# Patient Record
Sex: FEMALE | Race: WHITE | NOT HISPANIC OR LATINO | Employment: OTHER
[De-identification: names, ages, dates, MRNs, and addresses within clinical notes are randomized per-mention and may not be internally consistent; named-entity substitution may affect disease eponyms.]

---

## 2018-09-11 ENCOUNTER — DIAGNOSTIC TRANS (OUTPATIENT)
Dept: OTHER | Age: 68
End: 2018-09-11

## 2018-09-11 ENCOUNTER — HOSPITAL (OUTPATIENT)
Dept: OTHER | Age: 68
End: 2018-09-11
Attending: INTERNAL MEDICINE

## 2018-09-11 LAB
AMORPH SED URNS QL MICRO: ABNORMAL
ANALYZER ANC (IANC): ABNORMAL
ANION GAP SERPL CALC-SCNC: 9 MMOL/L (ref 10–20)
APPEARANCE UR: CLEAR
BACTERIA #/AREA URNS HPF: ABNORMAL /HPF
BASOPHILS # BLD: 0.1 THOUSAND/MCL (ref 0–0.3)
BASOPHILS NFR BLD: 1 %
BILIRUB UR QL: NEGATIVE
BUN SERPL-MCNC: 13 MG/DL (ref 6–20)
BUN/CREAT SERPL: 13 (ref 7–25)
CALCIUM SERPL-MCNC: 9.3 MG/DL (ref 8.4–10.2)
CAOX CRY URNS QL MICRO: ABNORMAL
CHLORIDE: 103 MMOL/L (ref 98–107)
CO2 SERPL-SCNC: 29 MMOL/L (ref 21–32)
COLOR UR: ABNORMAL
CREAT SERPL-MCNC: 1 MG/DL (ref 0.51–0.95)
DIFFERENTIAL METHOD BLD: ABNORMAL
EOSINOPHIL # BLD: 0.2 THOUSAND/MCL (ref 0.1–0.5)
EOSINOPHIL NFR BLD: 4 %
EPITH CASTS #/AREA URNS LPF: ABNORMAL /[LPF]
ERYTHROCYTE [DISTWIDTH] IN BLOOD: 13.2 % (ref 11–15)
FATTY CASTS #/AREA URNS LPF: ABNORMAL /[LPF]
GLUCOSE SERPL-MCNC: 64 MG/DL (ref 65–99)
GLUCOSE UR-MCNC: NEGATIVE MG/DL
GRAN CASTS #/AREA URNS LPF: ABNORMAL /[LPF]
HEMATOCRIT: 40.9 % (ref 36–46.5)
HGB BLD-MCNC: 13.7 GM/DL (ref 12–15.5)
HGB UR QL: ABNORMAL
HYALINE CASTS #/AREA URNS LPF: ABNORMAL /LPF (ref 0–5)
IMM GRANULOCYTES # BLD AUTO: 0 THOUSAND/MCL (ref 0–0.2)
IMM GRANULOCYTES NFR BLD: 0 %
INR PPP: 1
KETONES UR-MCNC: NEGATIVE MG/DL
LEUKOCYTE ESTERASE UR QL STRIP: NEGATIVE
LYMPHOCYTES # BLD: 0.8 THOUSAND/MCL (ref 1–4)
LYMPHOCYTES NFR BLD: 15 %
MCH RBC QN AUTO: 27.7 PG (ref 26–34)
MCHC RBC AUTO-ENTMCNC: 33.5 GM/DL (ref 32–36.5)
MCV RBC AUTO: 82.8 FL (ref 78–100)
MIXED CELL CASTS #/AREA URNS LPF: ABNORMAL /[LPF]
MONOCYTES # BLD: 0.5 THOUSAND/MCL (ref 0.3–0.9)
MONOCYTES NFR BLD: 8 %
MUCOUS THREADS URNS QL MICRO: ABNORMAL
NEUTROPHILS # BLD: 4 THOUSAND/MCL (ref 1.8–7.7)
NEUTROPHILS NFR BLD: 72 %
NEUTS SEG NFR BLD: ABNORMAL %
NITRITE UR QL: NEGATIVE
NRBC (NRBCRE): 0 /100 WBC
PH UR: 7 UNIT (ref 5–7)
PLATELET # BLD: 182 THOUSAND/MCL (ref 140–450)
POTASSIUM SERPL-SCNC: 4.1 MMOL/L (ref 3.4–5.1)
PROT UR QL: NEGATIVE MG/DL
PROTHROMBIN TIME: 10.7 SECONDS (ref 9.7–11.8)
PROTHROMBIN TIME: NORMAL
RBC # BLD: 4.94 MILLION/MCL (ref 4–5.2)
RBC #/AREA URNS HPF: ABNORMAL /HPF (ref 0–3)
RBC CASTS #/AREA URNS LPF: ABNORMAL /[LPF]
RENAL EPI CELLS #/AREA URNS HPF: ABNORMAL /[HPF]
SODIUM SERPL-SCNC: 137 MMOL/L (ref 135–145)
SP GR UR: 1.01 (ref 1–1.03)
SPECIMEN SOURCE: ABNORMAL
SPERM URNS QL MICRO: ABNORMAL
SQUAMOUS #/AREA URNS HPF: ABNORMAL /HPF (ref 0–5)
T VAGINALIS URNS QL MICRO: ABNORMAL
TRI-PHOS CRY URNS QL MICRO: ABNORMAL
TSH SERPL-ACNC: 0.25 MCUNIT/ML (ref 0.35–5)
URATE CRY URNS QL MICRO: ABNORMAL
UROBILINOGEN UR QL: 0.2 MG/DL (ref 0–1)
WAXY CASTS #/AREA URNS LPF: ABNORMAL /[LPF]
WBC # BLD: 5.6 THOUSAND/MCL (ref 4.2–11)
WBC #/AREA URNS HPF: ABNORMAL /HPF (ref 0–5)
WBC CASTS #/AREA URNS LPF: ABNORMAL /[LPF]
YEAST HYPHAE URNS QL MICRO: ABNORMAL
YEAST URNS QL MICRO: ABNORMAL

## 2018-09-20 ENCOUNTER — CHARTING TRANS (OUTPATIENT)
Dept: OTHER | Age: 68
End: 2018-09-20

## 2018-09-20 ENCOUNTER — HOSPITAL (OUTPATIENT)
Dept: OTHER | Age: 68
End: 2018-09-20
Attending: INTERNAL MEDICINE

## 2018-09-21 LAB
ANALYZER ANC (IANC): ABNORMAL
ANION GAP SERPL CALC-SCNC: 13 MMOL/L (ref 10–20)
BASOPHILS # BLD: 0 THOUSAND/MCL (ref 0–0.3)
BASOPHILS NFR BLD: 0 %
BUN SERPL-MCNC: 14 MG/DL (ref 6–20)
BUN/CREAT SERPL: 16 (ref 7–25)
CALCIUM SERPL-MCNC: 8.2 MG/DL (ref 8.4–10.2)
CHLORIDE: 103 MMOL/L (ref 98–107)
CO2 SERPL-SCNC: 26 MMOL/L (ref 21–32)
CREAT SERPL-MCNC: 0.87 MG/DL (ref 0.51–0.95)
DIFFERENTIAL METHOD BLD: ABNORMAL
EOSINOPHIL # BLD: 0 THOUSAND/MCL (ref 0.1–0.5)
EOSINOPHIL NFR BLD: 0 %
ERYTHROCYTE [DISTWIDTH] IN BLOOD: 13.1 % (ref 11–15)
GLUCOSE SERPL-MCNC: 160 MG/DL (ref 65–99)
HEMATOCRIT: 33.7 % (ref 36–46.5)
HGB BLD-MCNC: 11.4 GM/DL (ref 12–15.5)
IMM GRANULOCYTES # BLD AUTO: 0 THOUSAND/MCL (ref 0–0.2)
IMM GRANULOCYTES NFR BLD: 0 %
LYMPHOCYTES # BLD: 0.7 THOUSAND/MCL (ref 1–4)
LYMPHOCYTES NFR BLD: 6 %
MCH RBC QN AUTO: 27.7 PG (ref 26–34)
MCHC RBC AUTO-ENTMCNC: 33.8 GM/DL (ref 32–36.5)
MCV RBC AUTO: 82 FL (ref 78–100)
MONOCYTES # BLD: 0.5 THOUSAND/MCL (ref 0.3–0.9)
MONOCYTES NFR BLD: 4 %
NEUTROPHILS # BLD: 10.6 THOUSAND/MCL (ref 1.8–7.7)
NEUTROPHILS NFR BLD: 90 %
NEUTS SEG NFR BLD: ABNORMAL %
NRBC (NRBCRE): 0 /100 WBC
PLATELET # BLD: 157 THOUSAND/MCL (ref 140–450)
POTASSIUM SERPL-SCNC: 3.9 MMOL/L (ref 3.4–5.1)
RBC # BLD: 4.11 MILLION/MCL (ref 4–5.2)
SODIUM SERPL-SCNC: 138 MMOL/L (ref 135–145)
WBC # BLD: 11.8 THOUSAND/MCL (ref 4.2–11)

## 2021-06-17 ENCOUNTER — IMAGING SERVICES (OUTPATIENT)
Dept: MRI IMAGING | Age: 71
End: 2021-06-17
Attending: PODIATRIST

## 2021-06-17 DIAGNOSIS — M19.079 ARTHRITIS, MIDFOOT: ICD-10-CM

## 2021-06-17 PROCEDURE — 73718 MRI LOWER EXTREMITY W/O DYE: CPT | Performed by: RADIOLOGY

## 2023-01-31 ENCOUNTER — APPOINTMENT (OUTPATIENT)
Dept: URBAN - METROPOLITAN AREA CLINIC 245 | Age: 73
Setting detail: DERMATOLOGY
End: 2023-02-01

## 2023-01-31 DIAGNOSIS — B35.1 TINEA UNGUIUM: ICD-10-CM

## 2023-01-31 PROCEDURE — 99213 OFFICE O/P EST LOW 20 MIN: CPT

## 2023-01-31 PROCEDURE — OTHER COUNSELING: OTHER

## 2023-01-31 PROCEDURE — OTHER PRESCRIPTION: OTHER

## 2023-01-31 PROCEDURE — OTHER MIPS QUALITY: OTHER

## 2023-01-31 RX ORDER — GENTAMICIN SULFATE 1 MG/G
OINTMENT TOPICAL
Qty: 15 | Refills: 5 | Status: ACTIVE

## 2023-01-31 NOTE — PROCEDURE: MIPS QUALITY
2019 [facility] Clinical Data Registry (for Quality Improvement)     Postoperative nausea/vomiting risk protocol (Adult = 18 yrs and Pediatric 3-17 yrs)- (430 and 463)  General inhalation anesthetic (NOT TIVA) with PONV risk factors: Yes  Provision of anti-emetic therapy with at least 2 different classes of agents: Yes   Patient DID NOT receive anti-emetic therapy and reason is documented in Medical Record:  N/A    Multimodal Pain Management- (AQI59)  Patient undergoing Elective Surgery (i.e. Outpatient, or ASC, or Prescheduled Surgery prior to Hospital Admission): Yes  Use of Multimodal Pain Management, two or more drugs and/or interventions, NOT including systemic opioids: Yes   Exception: Documented allergy to multiple classes of analgesics:  N/A    PACU assessment of acute postoperative pain prior to Anesthesia Care End- Applies to Patients Age = 18- (ABG7)  Initial PACU pain score is which of the following: < 7/10  Patient unable to report pain score: N/A    Post-anesthetic transfer of care checklist/protocol to PACU/ICU- (426 and 427)  Upon conclusion of case, patient transferred to which of the following locations: PACU/Non-ICU  Use of transfer checklist/protocol: Yes  Exclusion: Service Performed in Patient Hospital Room (and thus did not require transfer): N/A    PACU Reintubation- (AQI31)  General anesthesia requiring endotracheal intubation (ETT) along with subsequent extubation in OR or PACU: Yes  Required reintubation in the PACU: No   Extubation was a planned trial documented in the medical record prior to removal of the original airway device:  N/A    Unplanned admission to ICU related to anesthesia service up through end of PACU care- (MD51)  Unplanned admission to ICU (not initially anticipated at anesthesia start time): No         
Quality 110: Preventive Care And Screening: Influenza Immunization: Influenza Immunization previously received during influenza season
Detail Level: Detailed
Quality 226: Preventive Care And Screening: Tobacco Use: Screening And Cessation Intervention: Patient screened for tobacco use and is an ex/non-smoker

## 2023-06-19 ENCOUNTER — APPOINTMENT (OUTPATIENT)
Dept: URBAN - METROPOLITAN AREA CLINIC 245 | Age: 73
Setting detail: DERMATOLOGY
End: 2023-06-19

## 2023-06-19 DIAGNOSIS — Z85.828 PERSONAL HISTORY OF OTHER MALIGNANT NEOPLASM OF SKIN: ICD-10-CM

## 2023-06-19 DIAGNOSIS — L81.4 OTHER MELANIN HYPERPIGMENTATION: ICD-10-CM

## 2023-06-19 DIAGNOSIS — L57.0 ACTINIC KERATOSIS: ICD-10-CM

## 2023-06-19 DIAGNOSIS — L82.1 OTHER SEBORRHEIC KERATOSIS: ICD-10-CM

## 2023-06-19 DIAGNOSIS — D22 MELANOCYTIC NEVI: ICD-10-CM

## 2023-06-19 DIAGNOSIS — L82.0 INFLAMED SEBORRHEIC KERATOSIS: ICD-10-CM

## 2023-06-19 DIAGNOSIS — D18.0 HEMANGIOMA: ICD-10-CM

## 2023-06-19 PROBLEM — D18.01 HEMANGIOMA OF SKIN AND SUBCUTANEOUS TISSUE: Status: ACTIVE | Noted: 2023-06-19

## 2023-06-19 PROBLEM — D22.5 MELANOCYTIC NEVI OF TRUNK: Status: ACTIVE | Noted: 2023-06-19

## 2023-06-19 PROCEDURE — OTHER LIQUID NITROGEN: OTHER

## 2023-06-19 PROCEDURE — 99213 OFFICE O/P EST LOW 20 MIN: CPT | Mod: 25

## 2023-06-19 PROCEDURE — OTHER MIPS QUALITY: OTHER

## 2023-06-19 PROCEDURE — OTHER COUNSELING: OTHER

## 2023-06-19 PROCEDURE — 17110 DESTRUCT B9 LESION 1-14: CPT

## 2023-06-19 PROCEDURE — 17000 DESTRUCT PREMALG LESION: CPT | Mod: 59

## 2023-06-19 ASSESSMENT — LOCATION DETAILED DESCRIPTION DERM
LOCATION DETAILED: MIDDLE STERNUM
LOCATION DETAILED: LEFT RIB CAGE
LOCATION DETAILED: LEFT INFRAMAMMARY CREASE (INNER QUADRANT)
LOCATION DETAILED: LEFT ANTERIOR PROXIMAL THIGH
LOCATION DETAILED: SUBXIPHOID
LOCATION DETAILED: LEFT SUPERIOR UPPER BACK
LOCATION DETAILED: RIGHT RIB CAGE
LOCATION DETAILED: EPIGASTRIC SKIN
LOCATION DETAILED: RIGHT MID-UPPER BACK
LOCATION DETAILED: RIGHT ANTERIOR SHOULDER

## 2023-06-19 ASSESSMENT — LOCATION SIMPLE DESCRIPTION DERM
LOCATION SIMPLE: RIGHT UPPER BACK
LOCATION SIMPLE: ABDOMEN
LOCATION SIMPLE: LEFT THIGH
LOCATION SIMPLE: CHEST
LOCATION SIMPLE: LEFT BREAST
LOCATION SIMPLE: RIGHT SHOULDER
LOCATION SIMPLE: LEFT UPPER BACK

## 2023-06-19 ASSESSMENT — LOCATION ZONE DERM
LOCATION ZONE: LEG
LOCATION ZONE: TRUNK
LOCATION ZONE: ARM

## 2023-06-19 NOTE — PROCEDURE: LIQUID NITROGEN
Show Spray Paint Technique Variable?: Yes
Include Z78.9 (Other Specified Conditions Influencing Health Status) As An Associated Diagnosis?: No
Number Of Freeze-Thaw Cycles: 1 freeze-thaw cycle
Post-Care Instructions: Patient is to avoid UV exposure, wear sun protection and avoid picking at any of the treated lesions. Pt may apply Vaseline to crusted or scabbing areas.
Detail Level: Detailed
Medical Necessity Information: It is in your best interest to select a reason for this procedure from the list below. All of these items fulfill various CMS LCD requirements except the new and changing color options.
Application Tool (Optional): Liquid Nitrogen Sprayer
Spray Paint Text: The liquid nitrogen was applied to the skin utilizing a spray paint frosting technique.
Post-Care Instructions: Patient is to wear sun protection and avoid picking at any of the treated lesions. Pt may apply Vaseline to crusted or scabbing areas.
Consent was obtained to proceed with treatment.
Medical Necessity Clause: This procedure was medically necessary because the lesions that were treated were:
Consent was obtained prior to treatment.
Duration Of Freeze Thaw-Cycle (Seconds): 5
Duration Of Freeze Thaw-Cycle (Seconds): 2
Number Of Freeze-Thaw Cycles: 2 freeze-thaw cycles

## 2023-10-25 DIAGNOSIS — M48.02 CERVICAL SPINAL STENOSIS: Primary | ICD-10-CM

## 2023-11-01 ENCOUNTER — IMAGING SERVICES (OUTPATIENT)
Dept: MRI IMAGING | Age: 73
End: 2023-11-01
Attending: PSYCHIATRY & NEUROLOGY

## 2023-11-01 DIAGNOSIS — M48.02 CERVICAL SPINAL STENOSIS: ICD-10-CM

## 2023-11-01 PROCEDURE — 72141 MRI NECK SPINE W/O DYE: CPT | Performed by: RADIOLOGY

## 2024-03-21 ENCOUNTER — APPOINTMENT (RX ONLY)
Dept: URBAN - METROPOLITAN AREA CLINIC 84 | Facility: CLINIC | Age: 74
Setting detail: DERMATOLOGY
End: 2024-03-21

## 2024-03-21 DIAGNOSIS — L30.4 ERYTHEMA INTERTRIGO: ICD-10-CM

## 2024-03-21 DIAGNOSIS — L20.89 OTHER ATOPIC DERMATITIS: ICD-10-CM

## 2024-03-21 PROCEDURE — ? PRESCRIPTION MEDICATION MANAGEMENT

## 2024-03-21 PROCEDURE — ? PRESCRIPTION

## 2024-03-21 PROCEDURE — 99203 OFFICE O/P NEW LOW 30 MIN: CPT

## 2024-03-21 PROCEDURE — ? COUNSELING

## 2024-03-21 RX ORDER — NYSTATIN 100000 [USP'U]/G
POWDER TOPICAL
Qty: 60 | Refills: 4 | Status: ERX | COMMUNITY
Start: 2024-03-21

## 2024-03-21 RX ORDER — KETOCONAZOLE 20 MG/G
CREAM TOPICAL TWICE DAILY
Qty: 60 | Refills: 2 | Status: ERX | COMMUNITY
Start: 2024-03-21

## 2024-03-21 RX ORDER — TRIAMCINOLONE ACETONIDE 1 MG/G
CREAM TOPICAL
Qty: 80 | Refills: 6 | Status: ERX | COMMUNITY
Start: 2024-03-21

## 2024-03-21 RX ADMIN — NYSTATIN: 100000 POWDER TOPICAL at 00:00

## 2024-03-21 RX ADMIN — KETOCONAZOLE: 20 CREAM TOPICAL at 00:00

## 2024-03-21 RX ADMIN — TRIAMCINOLONE ACETONIDE: 1 CREAM TOPICAL at 00:00

## 2024-03-21 ASSESSMENT — LOCATION SIMPLE DESCRIPTION DERM
LOCATION SIMPLE: LEFT THIGH
LOCATION SIMPLE: GROIN

## 2024-03-21 ASSESSMENT — LOCATION DETAILED DESCRIPTION DERM
LOCATION DETAILED: LEFT ANTERIOR PROXIMAL THIGH
LOCATION DETAILED: RIGHT SUPRAPUBIC SKIN

## 2024-03-21 ASSESSMENT — LOCATION ZONE DERM
LOCATION ZONE: LEG
LOCATION ZONE: TRUNK

## 2024-03-21 NOTE — PROCEDURE: PRESCRIPTION MEDICATION MANAGEMENT
Plan: Cleanse daily Pat dry or use blow dryer
Render In Strict Bullet Format?: No
Initiate Treatment: nystatin 100,000 unit/gram topical powder \\nQuantity: 60.0 g  Days Supply: 30\\nSig: Apply once a day for maintenance to AA\\n\\nketoconazole 2 % topical cream Twice daily\\nQuantity: 60.0 g  Days Supply: 30\\nSig: Mix 1:1 with Triamcinolone cream to affected area in groin 2x daily until better\\n\\ntriamcinolone acetonide 0.1 % topical cream \\nQuantity: 80.0 g  Days Supply: 30\\nSig: Mix with ketoconazole cream Apply to affected area 2x daily then as needed for active flares
Detail Level: Zone
Initiate Treatment: triamcinolone acetonide 0.1 % topical cream
Discontinue Regimen: Clobetasol from prior provider. Yuko reviewed risks of atrophy and potency of the medication

## 2024-06-24 ENCOUNTER — APPOINTMENT (OUTPATIENT)
Dept: URBAN - METROPOLITAN AREA CLINIC 245 | Age: 74
Setting detail: DERMATOLOGY
End: 2024-06-24

## 2024-06-24 DIAGNOSIS — Z85.828 PERSONAL HISTORY OF OTHER MALIGNANT NEOPLASM OF SKIN: ICD-10-CM

## 2024-06-24 DIAGNOSIS — L82.1 OTHER SEBORRHEIC KERATOSIS: ICD-10-CM

## 2024-06-24 DIAGNOSIS — L81.4 OTHER MELANIN HYPERPIGMENTATION: ICD-10-CM

## 2024-06-24 DIAGNOSIS — D18.0 HEMANGIOMA: ICD-10-CM

## 2024-06-24 DIAGNOSIS — D22 MELANOCYTIC NEVI: ICD-10-CM

## 2024-06-24 PROBLEM — D22.39 MELANOCYTIC NEVI OF OTHER PARTS OF FACE: Status: ACTIVE | Noted: 2024-06-24

## 2024-06-24 PROBLEM — D22.5 MELANOCYTIC NEVI OF TRUNK: Status: ACTIVE | Noted: 2024-06-24

## 2024-06-24 PROBLEM — D18.01 HEMANGIOMA OF SKIN AND SUBCUTANEOUS TISSUE: Status: ACTIVE | Noted: 2024-06-24

## 2024-06-24 PROCEDURE — 99213 OFFICE O/P EST LOW 20 MIN: CPT

## 2024-06-24 PROCEDURE — OTHER MIPS QUALITY: OTHER

## 2024-06-24 PROCEDURE — OTHER COUNSELING: OTHER

## 2024-06-24 ASSESSMENT — LOCATION ZONE DERM
LOCATION ZONE: NOSE
LOCATION ZONE: TRUNK
LOCATION ZONE: FACE

## 2024-06-24 ASSESSMENT — LOCATION DETAILED DESCRIPTION DERM
LOCATION DETAILED: RIGHT MID-UPPER BACK
LOCATION DETAILED: LEFT SUPERIOR MEDIAL UPPER BACK
LOCATION DETAILED: RIGHT INFERIOR UPPER BACK
LOCATION DETAILED: LEFT SUPERIOR UPPER BACK
LOCATION DETAILED: RIGHT FOREHEAD
LOCATION DETAILED: NASAL DORSUM

## 2024-06-24 ASSESSMENT — LOCATION SIMPLE DESCRIPTION DERM
LOCATION SIMPLE: NOSE
LOCATION SIMPLE: RIGHT UPPER BACK
LOCATION SIMPLE: RIGHT FOREHEAD
LOCATION SIMPLE: LEFT UPPER BACK

## 2024-10-08 ENCOUNTER — ANCILLARY PROCEDURE (OUTPATIENT)
Dept: CARDIOLOGY | Age: 74
End: 2024-10-08
Attending: INTERNAL MEDICINE

## 2024-10-08 DIAGNOSIS — Z01.818 ENCOUNTER FOR OTHER PREPROCEDURAL EXAMINATION: ICD-10-CM

## 2024-10-08 DIAGNOSIS — Z01.818 ENCOUNTER FOR OTHER PREPROCEDURAL EXAMINATION: Primary | ICD-10-CM

## 2024-10-08 PROCEDURE — 93000 ELECTROCARDIOGRAM COMPLETE: CPT | Performed by: INTERNAL MEDICINE

## 2024-10-16 ENCOUNTER — ANESTHESIA EVENT (OUTPATIENT)
Dept: SURGERY | Age: 74
End: 2024-10-16

## 2024-10-18 RX ORDER — TRAMADOL HYDROCHLORIDE 100 MG/1
100 TABLET, EXTENDED RELEASE ORAL DAILY PRN
COMMUNITY
Start: 2024-10-07

## 2024-10-18 RX ORDER — LEVOTHYROXINE SODIUM 125 UG/1
125 TABLET ORAL DAILY
COMMUNITY
Start: 2024-09-24

## 2024-10-18 RX ORDER — FOSINOPRIL SODIUM 40 MG/1
40 TABLET ORAL DAILY
COMMUNITY
Start: 2023-10-30

## 2024-10-18 RX ORDER — DEXLANSOPRAZOLE 60 MG/1
1 CAPSULE, DELAYED RELEASE ORAL DAILY
COMMUNITY
Start: 2021-05-17

## 2024-10-18 RX ORDER — LORATADINE 10 MG/1
10 TABLET ORAL DAILY
COMMUNITY
Start: 2024-08-15

## 2024-10-18 RX ORDER — HYDROCHLOROTHIAZIDE 12.5 MG/1
12.5 TABLET ORAL EVERY OTHER DAY
COMMUNITY

## 2024-10-18 RX ORDER — FERROUS SULFATE 325(65) MG
325 TABLET ORAL
COMMUNITY

## 2024-10-18 RX ORDER — PREGABALIN 75 MG/1
75 CAPSULE ORAL 2 TIMES DAILY
COMMUNITY
Start: 2024-09-24

## 2024-10-18 RX ORDER — FAMOTIDINE 40 MG/1
40 TABLET, FILM COATED ORAL DAILY
COMMUNITY
Start: 2024-09-24

## 2024-10-18 RX ORDER — LIOTHYRONINE SODIUM 5 UG/1
5 TABLET ORAL DAILY
COMMUNITY
Start: 2024-09-24

## 2024-10-18 RX ORDER — POLYETHYLENE GLYCOL 3350 17 G/17G
17 POWDER, FOR SOLUTION ORAL DAILY PRN
COMMUNITY

## 2024-10-18 RX ORDER — MONTELUKAST SODIUM 10 MG/1
10 TABLET ORAL NIGHTLY
COMMUNITY
Start: 2024-10-07

## 2024-10-18 RX ORDER — ATORVASTATIN CALCIUM 10 MG/1
10 TABLET, FILM COATED ORAL DAILY
COMMUNITY
Start: 2024-10-07

## 2024-10-21 ENCOUNTER — ANESTHESIA (OUTPATIENT)
Dept: SURGERY | Age: 74
End: 2024-10-21

## 2024-10-21 ENCOUNTER — MOBILE (OUTPATIENT)
Dept: SURGERY | Age: 74
End: 2024-10-21

## 2024-10-21 ENCOUNTER — HOSPITAL ENCOUNTER (OUTPATIENT)
Age: 74
Discharge: HOME | End: 2024-10-21
Attending: ORTHOPAEDIC SURGERY | Admitting: ORTHOPAEDIC SURGERY

## 2024-10-21 ENCOUNTER — APPOINTMENT (OUTPATIENT)
Dept: GENERAL RADIOLOGY | Age: 74
End: 2024-10-21
Attending: ORTHOPAEDIC SURGERY

## 2024-10-21 DIAGNOSIS — M43.16 SPONDYLOLISTHESIS AT L4-L5 LEVEL: Primary | Chronic | ICD-10-CM

## 2024-10-21 PROBLEM — M48.062 SPINAL STENOSIS OF LUMBAR REGION WITH NEUROGENIC CLAUDICATION: Chronic | Status: ACTIVE | Noted: 2024-10-21

## 2024-10-21 PROBLEM — S32.019A L1 VERTEBRAL FRACTURE  (CMD): Chronic | Status: ACTIVE | Noted: 2024-10-21

## 2024-10-21 LAB
ABO + RH BLD: NORMAL
BLD GP AB SCN SERPL QL GEL: NEGATIVE
GLUCOSE BLDC GLUCOMTR-MCNC: 94 MG/DL (ref 70–99)
TYPE AND SCREEN EXPIRATION DATE: NORMAL

## 2024-10-21 PROCEDURE — 10002807 HB RX 258

## 2024-10-21 PROCEDURE — 10005281 FL INTRAOPERATIVE C ARM NO REPORT

## 2024-10-21 PROCEDURE — 10006023 HB SUPPLY 272: Performed by: ORTHOPAEDIC SURGERY

## 2024-10-21 PROCEDURE — 10002801 HB RX 250 W/O HCPCS

## 2024-10-21 PROCEDURE — 13000111 HB NEURO COMPLEX CASE EA ADD MINUTE: Performed by: ORTHOPAEDIC SURGERY

## 2024-10-21 PROCEDURE — 10002803 HB RX 637

## 2024-10-21 PROCEDURE — 13000003 HB ANESTHESIA  GENERAL EA ADD MINUTE: Performed by: ORTHOPAEDIC SURGERY

## 2024-10-21 PROCEDURE — 10006027 HB SUPPLY 278: Performed by: ORTHOPAEDIC SURGERY

## 2024-10-21 PROCEDURE — 13000001 HB PHASE II RECOVERY EA 30 MINUTES: Performed by: ORTHOPAEDIC SURGERY

## 2024-10-21 PROCEDURE — 10002803 HB RX 637: Performed by: PHYSICIAN ASSISTANT

## 2024-10-21 PROCEDURE — 10004651 HB RX, NO CHARGE ITEM: Performed by: PHYSICIAN ASSISTANT

## 2024-10-21 PROCEDURE — 82962 GLUCOSE BLOOD TEST: CPT

## 2024-10-21 PROCEDURE — 10004451 HB PACU RECOVERY 1ST 30 MINUTES: Performed by: ORTHOPAEDIC SURGERY

## 2024-10-21 PROCEDURE — 10002800 HB RX 250 W HCPCS

## 2024-10-21 PROCEDURE — 10002800 HB RX 250 W HCPCS: Performed by: ORTHOPAEDIC SURGERY

## 2024-10-21 PROCEDURE — 86900 BLOOD TYPING SEROLOGIC ABO: CPT | Performed by: ORTHOPAEDIC SURGERY

## 2024-10-21 PROCEDURE — 13000110 HB NEURO COMPLEX CASE S/U + 1ST 15 MIN: Performed by: ORTHOPAEDIC SURGERY

## 2024-10-21 PROCEDURE — 13000002 HB ANESTHESIA  GENERAL  S/U + 1ST 15 MIN: Performed by: ORTHOPAEDIC SURGERY

## 2024-10-21 PROCEDURE — 10004452 HB PACU ADDL 30 MINUTES: Performed by: ORTHOPAEDIC SURGERY

## 2024-10-21 PROCEDURE — 10002805 HB CONTRAST AGENT: Performed by: ORTHOPAEDIC SURGERY

## 2024-10-21 PROCEDURE — C1713 ANCHOR/SCREW BN/BN,TIS/BN: HCPCS | Performed by: ORTHOPAEDIC SURGERY

## 2024-10-21 DEVICE — CEMENT BN KYPHON HV-R: Type: IMPLANTABLE DEVICE | Site: SPINE LUMBAR | Status: FUNCTIONAL

## 2024-10-21 RX ORDER — SODIUM CHLORIDE 9 MG/ML
INJECTION, SOLUTION INTRAVENOUS CONTINUOUS
Status: DISCONTINUED | OUTPATIENT
Start: 2024-10-21 | End: 2024-10-21 | Stop reason: HOSPADM

## 2024-10-21 RX ORDER — SODIUM CHLORIDE, SODIUM LACTATE, POTASSIUM CHLORIDE, CALCIUM CHLORIDE 600; 310; 30; 20 MG/100ML; MG/100ML; MG/100ML; MG/100ML
INJECTION, SOLUTION INTRAVENOUS CONTINUOUS
Status: DISCONTINUED | OUTPATIENT
Start: 2024-10-21 | End: 2024-10-21 | Stop reason: HOSPADM

## 2024-10-21 RX ORDER — LIDOCAINE HYDROCHLORIDE 20 MG/ML
INJECTION, SOLUTION INFILTRATION; PERINEURAL PRN
Status: DISCONTINUED | OUTPATIENT
Start: 2024-10-21 | End: 2024-10-21

## 2024-10-21 RX ORDER — DEXTROSE MONOHYDRATE 25 G/50ML
25 INJECTION, SOLUTION INTRAVENOUS PRN
Status: DISCONTINUED | OUTPATIENT
Start: 2024-10-21 | End: 2024-10-21 | Stop reason: HOSPADM

## 2024-10-21 RX ORDER — SODIUM CHLORIDE, SODIUM LACTATE, POTASSIUM CHLORIDE, CALCIUM CHLORIDE 600; 310; 30; 20 MG/100ML; MG/100ML; MG/100ML; MG/100ML
INJECTION, SOLUTION INTRAVENOUS CONTINUOUS PRN
Status: DISCONTINUED | OUTPATIENT
Start: 2024-10-21 | End: 2024-10-21

## 2024-10-21 RX ORDER — 0.9 % SODIUM CHLORIDE 0.9 %
10 VIAL (ML) INJECTION PRN
Status: DISCONTINUED | OUTPATIENT
Start: 2024-10-21 | End: 2024-10-21 | Stop reason: HOSPADM

## 2024-10-21 RX ORDER — DEXAMETHASONE SODIUM PHOSPHATE 4 MG/ML
INJECTION, SOLUTION INTRA-ARTICULAR; INTRALESIONAL; INTRAMUSCULAR; INTRAVENOUS; SOFT TISSUE PRN
Status: DISCONTINUED | OUTPATIENT
Start: 2024-10-21 | End: 2024-10-21

## 2024-10-21 RX ORDER — CEFAZOLIN SODIUM 1 G/3ML
INJECTION, POWDER, FOR SOLUTION INTRAMUSCULAR; INTRAVENOUS PRN
Status: DISCONTINUED | OUTPATIENT
Start: 2024-10-21 | End: 2024-10-21

## 2024-10-21 RX ORDER — ROCURONIUM BROMIDE 10 MG/ML
INJECTION, SOLUTION INTRAVENOUS PRN
Status: DISCONTINUED | OUTPATIENT
Start: 2024-10-21 | End: 2024-10-21

## 2024-10-21 RX ORDER — NICOTINE POLACRILEX 4 MG
30 LOZENGE BUCCAL
Status: DISCONTINUED | OUTPATIENT
Start: 2024-10-21 | End: 2024-10-21 | Stop reason: HOSPADM

## 2024-10-21 RX ORDER — ACETAMINOPHEN 500 MG
1000 TABLET ORAL
Status: COMPLETED | OUTPATIENT
Start: 2024-10-21 | End: 2024-10-21

## 2024-10-21 RX ORDER — ONDANSETRON 2 MG/ML
INJECTION INTRAMUSCULAR; INTRAVENOUS PRN
Status: DISCONTINUED | OUTPATIENT
Start: 2024-10-21 | End: 2024-10-21

## 2024-10-21 RX ORDER — 0.9 % SODIUM CHLORIDE 0.9 %
2 VIAL (ML) INJECTION EVERY 12 HOURS SCHEDULED
Status: DISCONTINUED | OUTPATIENT
Start: 2024-10-21 | End: 2024-10-21 | Stop reason: HOSPADM

## 2024-10-21 RX ORDER — SCOLOPAMINE TRANSDERMAL SYSTEM 1 MG/1
PATCH, EXTENDED RELEASE TRANSDERMAL
Status: COMPLETED
Start: 2024-10-21 | End: 2024-10-21

## 2024-10-21 RX ORDER — HYDROCODONE BITARTRATE AND ACETAMINOPHEN 5; 325 MG/1; MG/1
1 TABLET ORAL EVERY 6 HOURS PRN
Qty: 20 TABLET | Refills: 0 | Status: SHIPPED | OUTPATIENT
Start: 2024-10-21

## 2024-10-21 RX ORDER — SCOLOPAMINE TRANSDERMAL SYSTEM 1 MG/1
1 PATCH, EXTENDED RELEASE TRANSDERMAL ONCE
Status: DISCONTINUED | OUTPATIENT
Start: 2024-10-21 | End: 2024-10-21 | Stop reason: HOSPADM

## 2024-10-21 RX ORDER — BUPIVACAINE HYDROCHLORIDE 2.5 MG/ML
INJECTION, SOLUTION EPIDURAL; INFILTRATION; INTRACAUDAL PRN
Status: DISCONTINUED | OUTPATIENT
Start: 2024-10-21 | End: 2024-10-21 | Stop reason: HOSPADM

## 2024-10-21 RX ORDER — PROPOFOL 10 MG/ML
INJECTION, EMULSION INTRAVENOUS PRN
Status: DISCONTINUED | OUTPATIENT
Start: 2024-10-21 | End: 2024-10-21

## 2024-10-21 RX ORDER — GABAPENTIN 100 MG/1
200 CAPSULE ORAL
Status: COMPLETED | OUTPATIENT
Start: 2024-10-21 | End: 2024-10-21

## 2024-10-21 RX ORDER — HYDROCODONE BITARTRATE AND ACETAMINOPHEN 5; 325 MG/1; MG/1
1 TABLET ORAL EVERY 4 HOURS PRN
Status: DISCONTINUED | OUTPATIENT
Start: 2024-10-21 | End: 2024-10-21 | Stop reason: HOSPADM

## 2024-10-21 RX ORDER — DEXTROSE MONOHYDRATE 50 MG/ML
INJECTION, SOLUTION INTRAVENOUS CONTINUOUS PRN
Status: DISCONTINUED | OUTPATIENT
Start: 2024-10-21 | End: 2024-10-21 | Stop reason: HOSPADM

## 2024-10-21 RX ADMIN — SODIUM CHLORIDE, POTASSIUM CHLORIDE, SODIUM LACTATE AND CALCIUM CHLORIDE: 600; 310; 30; 20 INJECTION, SOLUTION INTRAVENOUS at 07:29

## 2024-10-21 RX ADMIN — HYDROMORPHONE HYDROCHLORIDE 0.4 MG: 1 INJECTION, SOLUTION INTRAMUSCULAR; INTRAVENOUS; SUBCUTANEOUS at 09:03

## 2024-10-21 RX ADMIN — ONDANSETRON 4 MG: 2 INJECTION INTRAMUSCULAR; INTRAVENOUS at 08:38

## 2024-10-21 RX ADMIN — ACETAMINOPHEN 1000 MG: 500 TABLET ORAL at 06:26

## 2024-10-21 RX ADMIN — SUGAMMADEX 200 MG: 100 INJECTION, SOLUTION INTRAVENOUS at 08:42

## 2024-10-21 RX ADMIN — HYDROMORPHONE HYDROCHLORIDE 0.4 MG: 1 INJECTION, SOLUTION INTRAMUSCULAR; INTRAVENOUS; SUBCUTANEOUS at 08:58

## 2024-10-21 RX ADMIN — LIDOCAINE HYDROCHLORIDE 100 MG: 20 INJECTION, SOLUTION INFILTRATION; PERINEURAL at 07:36

## 2024-10-21 RX ADMIN — FENTANYL CITRATE 50 MCG: 50 INJECTION INTRAMUSCULAR; INTRAVENOUS at 07:34

## 2024-10-21 RX ADMIN — HYDROMORPHONE HYDROCHLORIDE 0.2 MG: 1 INJECTION, SOLUTION INTRAMUSCULAR; INTRAVENOUS; SUBCUTANEOUS at 09:08

## 2024-10-21 RX ADMIN — FENTANYL CITRATE 50 MCG: 50 INJECTION INTRAMUSCULAR; INTRAVENOUS at 08:55

## 2024-10-21 RX ADMIN — SCOPOLAMINE 1 PATCH: 1 SYSTEM TRANSDERMAL at 07:26

## 2024-10-21 RX ADMIN — CEFAZOLIN 2 G: 2 INJECTION, POWDER, FOR SOLUTION INTRAMUSCULAR; INTRAVENOUS at 07:52

## 2024-10-21 RX ADMIN — DEXAMETHASONE SODIUM PHOSPHATE 10 MG: 4 INJECTION INTRA-ARTICULAR; INTRALESIONAL; INTRAMUSCULAR; INTRAVENOUS; SOFT TISSUE at 07:52

## 2024-10-21 RX ADMIN — ROCURONIUM BROMIDE 50 MG: 10 INJECTION INTRAVENOUS at 07:36

## 2024-10-21 RX ADMIN — PROPOFOL 150 MG: 10 INJECTION, EMULSION INTRAVENOUS at 07:36

## 2024-10-21 RX ADMIN — GABAPENTIN 200 MG: 100 CAPSULE ORAL at 06:26

## 2024-10-21 ASSESSMENT — ACTIVITIES OF DAILY LIVING (ADL)
ADL_SHORT_OF_BREATH: NO
HISTORY OF FALLING IN THE LAST YEAR (PRIOR TO ADMISSION): YES
ADL_BEFORE_ADMISSION: INDEPENDENT
NEEDS_ASSIST: NO
ADL_SCORE: 12

## 2024-10-21 ASSESSMENT — PAIN SCALES - GENERAL
PAINLEVEL_OUTOF10: 5
PAINLEVEL_OUTOF10: 6
PAINLEVEL_OUTOF10: 5
PAINLEVEL_OUTOF10: 8
PAINLEVEL_OUTOF10: 5
PAINLEVEL_OUTOF10: 8
PAINLEVEL_OUTOF10: 8
PAINLEVEL_OUTOF10: 4

## 2024-10-24 VITALS
RESPIRATION RATE: 20 BRPM | BODY MASS INDEX: 26.82 KG/M2 | HEART RATE: 69 BPM | OXYGEN SATURATION: 98 % | WEIGHT: 161 LBS | TEMPERATURE: 97.5 F | DIASTOLIC BLOOD PRESSURE: 69 MMHG | HEIGHT: 65 IN | SYSTOLIC BLOOD PRESSURE: 174 MMHG

## 2025-06-23 ENCOUNTER — APPOINTMENT (OUTPATIENT)
Dept: URBAN - METROPOLITAN AREA CLINIC 245 | Age: 75
Setting detail: DERMATOLOGY
End: 2025-06-23

## 2025-06-23 DIAGNOSIS — L81.4 OTHER MELANIN HYPERPIGMENTATION: ICD-10-CM

## 2025-06-23 DIAGNOSIS — L30.4 ERYTHEMA INTERTRIGO: ICD-10-CM

## 2025-06-23 DIAGNOSIS — Z85.828 PERSONAL HISTORY OF OTHER MALIGNANT NEOPLASM OF SKIN: ICD-10-CM

## 2025-06-23 DIAGNOSIS — D18.0 HEMANGIOMA: ICD-10-CM

## 2025-06-23 DIAGNOSIS — L20.89 OTHER ATOPIC DERMATITIS: ICD-10-CM

## 2025-06-23 DIAGNOSIS — R20.2 PARESTHESIA OF SKIN: ICD-10-CM

## 2025-06-23 DIAGNOSIS — L82.1 OTHER SEBORRHEIC KERATOSIS: ICD-10-CM

## 2025-06-23 DIAGNOSIS — D22 MELANOCYTIC NEVI: ICD-10-CM

## 2025-06-23 PROBLEM — D18.01 HEMANGIOMA OF SKIN AND SUBCUTANEOUS TISSUE: Status: ACTIVE | Noted: 2025-06-23

## 2025-06-23 PROBLEM — D23.39 OTHER BENIGN NEOPLASM OF SKIN OF OTHER PARTS OF FACE: Status: ACTIVE | Noted: 2025-06-23

## 2025-06-23 PROBLEM — D22.5 MELANOCYTIC NEVI OF TRUNK: Status: ACTIVE | Noted: 2025-06-23

## 2025-06-23 PROCEDURE — OTHER PRESCRIPTION MEDICATION MANAGEMENT: OTHER

## 2025-06-23 PROCEDURE — OTHER COUNSELING: TOPICAL STEROIDS: OTHER

## 2025-06-23 PROCEDURE — 99214 OFFICE O/P EST MOD 30 MIN: CPT

## 2025-06-23 PROCEDURE — OTHER ADDITIONAL NOTES: OTHER

## 2025-06-23 PROCEDURE — OTHER PRESCRIPTION: OTHER

## 2025-06-23 PROCEDURE — OTHER MIPS QUALITY: OTHER

## 2025-06-23 PROCEDURE — OTHER COUNSELING: OTHER

## 2025-06-23 RX ORDER — KETOCONAZOLE 20 MG/G
CREAM TOPICAL
Qty: 60 | Refills: 5 | Status: ERX | COMMUNITY
Start: 2025-06-23

## 2025-06-23 RX ORDER — TRIAMCINOLONE ACETONIDE 1 MG/G
CREAM TOPICAL
Qty: 454 | Refills: 1 | Status: ERX | COMMUNITY
Start: 2025-06-23

## 2025-06-23 ASSESSMENT — LOCATION DETAILED DESCRIPTION DERM
LOCATION DETAILED: LEFT INFRAMAMMARY CREASE (INNER QUADRANT)
LOCATION DETAILED: RIGHT MID-UPPER BACK
LOCATION DETAILED: LEFT SUPERIOR MEDIAL UPPER BACK
LOCATION DETAILED: RIGHT INFRAMAMMARY CREASE (INNER QUADRANT)
LOCATION DETAILED: LEFT SUPERIOR UPPER BACK
LOCATION DETAILED: RIGHT INFERIOR UPPER BACK
LOCATION DETAILED: LEFT PROXIMAL PRETIBIAL REGION
LOCATION DETAILED: RIGHT DISTAL PRETIBIAL REGION

## 2025-06-23 ASSESSMENT — LOCATION SIMPLE DESCRIPTION DERM
LOCATION SIMPLE: LEFT BREAST
LOCATION SIMPLE: RIGHT PRETIBIAL REGION
LOCATION SIMPLE: RIGHT BREAST
LOCATION SIMPLE: RIGHT UPPER BACK
LOCATION SIMPLE: LEFT PRETIBIAL REGION
LOCATION SIMPLE: LEFT UPPER BACK

## 2025-06-23 ASSESSMENT — LOCATION ZONE DERM
LOCATION ZONE: LEG
LOCATION ZONE: TRUNK

## 2025-06-25 ENCOUNTER — RX ONLY (RX ONLY)
Age: 75
End: 2025-06-25

## 2025-06-25 RX ORDER — CLOTRIMAZOLE 10 MG/G
CREAM TOPICAL
Qty: 113 | Refills: 1 | Status: ERX | COMMUNITY
Start: 2025-06-25

## (undated) DEVICE — COVER SRG CNTRL STRL LF LIGHT HNDL HARMONYAIR M SERIES HRMN

## (undated) DEVICE — KIT INST 153 BN TAMP KYPHON XPNDR 2 1STP DISP STRL LF

## (undated) DEVICE — SOLUTION IRR 1000ML 0.9% NACL PLASTIC POUR BTL ISTNC N-PYRG

## (undated) DEVICE — COVER EQUIPMENT C ARM

## (undated) DEVICE — DRESSING TRANS 4.75X4IN ADH HPOAL WTPRF TEGADERM PU STD STRL

## (undated) DEVICE — GLOVE SURG 7 PROTEXIS LF BLUE PF SMTH BEAD CUFF INTLK STRL

## (undated) DEVICE — HEADREST PSTN PUR DERMAPROX FOAM 2 SLOT TOPR LF DISP

## (undated) DEVICE — TUBING SCT CLR 12FT 316IN MDVC MAXI-GRIP NCDTV MALE TO MALE

## (undated) DEVICE — POSITIONER OR RSPBRY SWIRL 8X8.5X4IN DEVON HEAD PU FOAM SLOT

## (undated) DEVICE — GLOVE SURG 8.5 PROTEXIS LF BLUE PF SMTH BEAD CUFF INTLK STRL

## (undated) DEVICE — STRIP 4X.5IN STRSTRP POLY REINFORCE SKNCLS WHT STRL LF

## (undated) DEVICE — NEEDLE SPNL 18GA 3.5IN PVC FREE DEHP-FR STRL LF SPNCN

## (undated) DEVICE — Device

## (undated) DEVICE — SUTURE 2-0 COATED VICRYL PLUS UNDYED 27IN CP-1

## (undated) DEVICE — GLOVE SURG 6.5 PROTEXIS PI LF CRM PF BEAD CUFF STRL PLISPRN

## (undated) DEVICE — GLOVE SURG 6.5 PROTEXIS ESTM LF CRM PF SMTH BEAD CUFF INTLK

## (undated) DEVICE — DRAPE 2 INCS FILM POUCH ISL 129X100IN LG 27X12IN SURG STRDRP

## (undated) DEVICE — GLOVE SURG 6.5 PROTEXIS LF BLUE PF SMTH BEAD CUFF INTLK STRL